# Patient Record
Sex: FEMALE | Race: WHITE | NOT HISPANIC OR LATINO | Employment: FULL TIME | URBAN - METROPOLITAN AREA
[De-identification: names, ages, dates, MRNs, and addresses within clinical notes are randomized per-mention and may not be internally consistent; named-entity substitution may affect disease eponyms.]

---

## 2023-12-28 ENCOUNTER — APPOINTMENT (OUTPATIENT)
Dept: RADIOLOGY | Facility: HOSPITAL | Age: 28
End: 2023-12-28

## 2023-12-28 ENCOUNTER — HOSPITAL ENCOUNTER (EMERGENCY)
Facility: HOSPITAL | Age: 28
Discharge: HOME | End: 2023-12-29
Attending: STUDENT IN AN ORGANIZED HEALTH CARE EDUCATION/TRAINING PROGRAM

## 2023-12-28 DIAGNOSIS — L72.3 SEBACEOUS CYST: Primary | ICD-10-CM

## 2023-12-28 PROCEDURE — 70450 CT HEAD/BRAIN W/O DYE: CPT

## 2023-12-28 PROCEDURE — 70450 CT HEAD/BRAIN W/O DYE: CPT | Performed by: RADIOLOGY

## 2023-12-28 PROCEDURE — 99284 EMERGENCY DEPT VISIT MOD MDM: CPT | Performed by: STUDENT IN AN ORGANIZED HEALTH CARE EDUCATION/TRAINING PROGRAM

## 2023-12-28 ASSESSMENT — LIFESTYLE VARIABLES
EVER FELT BAD OR GUILTY ABOUT YOUR DRINKING: NO
REASON UNABLE TO ASSESS: NO
HAVE PEOPLE ANNOYED YOU BY CRITICIZING YOUR DRINKING: NO
HAVE YOU EVER FELT YOU SHOULD CUT DOWN ON YOUR DRINKING: NO
EVER HAD A DRINK FIRST THING IN THE MORNING TO STEADY YOUR NERVES TO GET RID OF A HANGOVER: NO

## 2023-12-28 ASSESSMENT — PAIN DESCRIPTION - ORIENTATION: ORIENTATION: LEFT;POSTERIOR

## 2023-12-28 ASSESSMENT — PAIN - FUNCTIONAL ASSESSMENT: PAIN_FUNCTIONAL_ASSESSMENT: 0-10

## 2023-12-28 ASSESSMENT — COLUMBIA-SUICIDE SEVERITY RATING SCALE - C-SSRS
1. IN THE PAST MONTH, HAVE YOU WISHED YOU WERE DEAD OR WISHED YOU COULD GO TO SLEEP AND NOT WAKE UP?: NO
2. HAVE YOU ACTUALLY HAD ANY THOUGHTS OF KILLING YOURSELF?: NO
6. HAVE YOU EVER DONE ANYTHING, STARTED TO DO ANYTHING, OR PREPARED TO DO ANYTHING TO END YOUR LIFE?: NO

## 2023-12-28 ASSESSMENT — PAIN DESCRIPTION - FREQUENCY: FREQUENCY: OTHER (COMMENT)

## 2023-12-28 ASSESSMENT — PAIN DESCRIPTION - LOCATION: LOCATION: HEAD

## 2023-12-28 ASSESSMENT — PAIN SCALES - GENERAL: PAINLEVEL_OUTOF10: 0 - NO PAIN

## 2023-12-29 VITALS
DIASTOLIC BLOOD PRESSURE: 88 MMHG | BODY MASS INDEX: 36.32 KG/M2 | HEART RATE: 80 BPM | OXYGEN SATURATION: 99 % | SYSTOLIC BLOOD PRESSURE: 128 MMHG | HEIGHT: 63 IN | WEIGHT: 205 LBS | TEMPERATURE: 97.9 F | RESPIRATION RATE: 16 BRPM

## 2023-12-29 NOTE — ED PROVIDER NOTES
History/Exam limitations: none  HPI was provided by patient    HPI:    Chief Complaint   Patient presents with    Mass     Bump on back of head has gotten bigger         Alyse Whelan is a 28 y.o. female presents with chief complaint of painful lesion on the left posterior occiput.  She states has been there for about a year but over the past few weeks has worsened in size.  She has history of migraines but does not associate any increased headache or numbness tingling or focal weakness.  Denies hitting her head or any bug bites that she knows of that may have caused the initial lesion.    ROS:  CONSTITUTIONAL:       fever, chills  ENT:       sore throat, congestion, rhinorrhea  CARDIOVASCULAR:       chest pain, palpitations, swelling  RESPIRATORY:       cough, wheeze, shortness of breath  GI:       nausea, vomiting, diarrhea, abdominal pain  MUSCULOSKELETAL:       deformity, neck pain  SKIN:       erythema, lesion, drainage, swelling  NEUROLOGIC:       headache, numbness, focal weakness  NOTES: All systems reviewed, negative except as described above       Physical Exam:  GENERAL: Alert, oriented , cooperative,  in no acute distress.  HEAD: normocephalic, atraumatic  SKIN:  dry skin, tender to palpation to left posterior occiput with quarter sized indurated round lesion on scalp with  no crepitus or erythema or streaking.  Not warm to touch.  No discharge or drainage.  No pain out of proportion to exam, compartment soft  NECK: Supple, without meningismus. Trachea at midline. No lymphadenopathy.  PULMONARY: Clear bilaterally. No crackles, rhonchi, wheezing.  No respiratory distress.  No work of breathing.  CARDIAC: Regular rate and regular rhythm.  Pulses 2+ in radials and dorsal pedal pulses bilaterally.  No murmur, rub, gallop.  No edema.  MUSCULOSKELETAL: Full range of motion throughout, no deformity.   NEUROLOGICAL:  CN II through XII are grossly intact, no focal neuro deficits.  Strength 5 out of 5 throughout  bilateral upper and lower extremities neurovascular intact in bilateral upper and lower extremities    PSYCHIATRIC: Appropriate mood and affect. Calm.       MDM/ED COURSE:      The patient presented for evaluation of lesion on her head.  Differential clues but limited to cellulitis, calcium deposit, will enlarged lymph node, calcium deposit, abscess.  Patient wanted imaging to further investigate the lesion so CT was performed.  Imaging interpreted by me and confirmed by radiology.      Patient feels safe for discharge home.  Patient nontoxic-appearing on reexamination.  Vital signs are stable.   The patient is in agreement with the plan and given instructions to follow up with dermatology on outpatient basis.                I discussed the differential, results and plan with the patient and/or family/friend/caregiver if present.       I emphasized the importance of follow-up with the physician I referred them to in the timeframe recommended.  I explained reasons for the patient to return to the Emergency Department. Additional verbal discharge instructions were also given and discussed with the patient to supplement those generated by the EMR. We also discussed medications that were prescribed (if any) including common side effects and interactions. The patient was advised to abstain from driving, operating heavy machinery or making significant decisions while taking medications such as opiates and muscle relaxers that may impair this. All questions were addressed.  They understand return precautions and discharge instructions. The patient and/or family/friend/caregiver expressed understanding.     Note: This note was dictated by speech recognition. Minor errors in transcription may be present.    ED Course as of 12/29/23 0100   Fri Dec 29, 2023   0031 Found to have a sebaceous cyst.  Will give her dermatology to follow-up with. [WL]      ED Course User Index  [WL] Tanner Morrison DO         Diagnoses as of  12/29/23 0100   Sebaceous cyst         No past medical history on file.   Social History     Socioeconomic History    Marital status: Single     Spouse name: Not on file    Number of children: Not on file    Years of education: Not on file    Highest education level: Not on file   Occupational History    Not on file   Tobacco Use    Smoking status: Not on file    Smokeless tobacco: Not on file   Substance and Sexual Activity    Alcohol use: Not on file    Drug use: Not on file    Sexual activity: Not on file   Other Topics Concern    Not on file   Social History Narrative    Not on file     Social Determinants of Health     Financial Resource Strain: Not on file   Food Insecurity: Not on file   Transportation Needs: Not on file   Physical Activity: Not on file   Stress: Not on file   Social Connections: Not on file   Intimate Partner Violence: Not on file   Housing Stability: Not on file     No current outpatient medications  Not on File          ED Triage Vitals [12/28/23 2206]   Temp Heart Rate Resp BP   36.6 °C (97.9 °F) 88 18 131/87      SpO2 Temp Source Heart Rate Source Patient Position   100 % Skin Other (Comment) Sitting      BP Location FiO2 (%)     Left arm --               Labs and Imaging  CT head wo IV contrast   Final Result   No acute intracranial abnormality.        There is a 1.4 x 1.7 x 1.6 cm hypodense focus in the left occipital   scalp with central calcification suspicious for a sebaceous cyst.        MACRO:   None        Signed by: Violeta Ross 12/28/2023 11:43 PM   Dictation workstation:   [id number]        Labs Reviewed - No data to display        Procedure  Procedures                  Tanner Morrison DO  12/29/23 0100

## 2025-01-10 ENCOUNTER — APPOINTMENT (OUTPATIENT)
Dept: PRIMARY CARE | Facility: CLINIC | Age: 30
End: 2025-01-10
Payer: COMMERCIAL